# Patient Record
Sex: MALE | Race: OTHER | HISPANIC OR LATINO | Employment: PART TIME | URBAN - METROPOLITAN AREA
[De-identification: names, ages, dates, MRNs, and addresses within clinical notes are randomized per-mention and may not be internally consistent; named-entity substitution may affect disease eponyms.]

---
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
## 2024-06-22 ENCOUNTER — APPOINTMENT (EMERGENCY)
Dept: RADIOLOGY | Facility: HOSPITAL | Age: 24
End: 2024-06-22
Payer: COMMERCIAL

## 2024-06-22 ENCOUNTER — HOSPITAL ENCOUNTER (EMERGENCY)
Facility: HOSPITAL | Age: 24
Discharge: HOME | End: 2024-06-22
Attending: EMERGENCY MEDICINE | Admitting: EMERGENCY MEDICINE
Payer: COMMERCIAL

## 2024-06-22 VITALS
OXYGEN SATURATION: 98 % | TEMPERATURE: 98.6 F | DIASTOLIC BLOOD PRESSURE: 79 MMHG | SYSTOLIC BLOOD PRESSURE: 145 MMHG | HEART RATE: 86 BPM | RESPIRATION RATE: 15 BRPM

## 2024-06-22 DIAGNOSIS — R07.89 CHEST DISCOMFORT: Primary | ICD-10-CM

## 2024-06-22 DIAGNOSIS — F41.9 ANXIETY: ICD-10-CM

## 2024-06-22 LAB
ATRIAL RATE: 82 BPM
P AXIS: 37 DEGREES
PR INTERVAL: 126 MS
QRS AXIS: 51 DEGREES
QRSD INTERVAL: 88 MS
QT INTERVAL: 364 MS
QTC INTERVAL: 425 MS
T WAVE AXIS: 15 DEGREES
VENTRICULAR RATE: 82 BPM

## 2024-06-22 PROCEDURE — 93005 ELECTROCARDIOGRAM TRACING: CPT

## 2024-06-22 PROCEDURE — 99285 EMERGENCY DEPT VISIT HI MDM: CPT | Performed by: EMERGENCY MEDICINE

## 2024-06-22 PROCEDURE — 71045 X-RAY EXAM CHEST 1 VIEW: CPT

## 2024-06-22 PROCEDURE — 99283 EMERGENCY DEPT VISIT LOW MDM: CPT

## 2024-06-22 RX ORDER — LORAZEPAM 0.5 MG/1
0.5 TABLET ORAL ONCE
Status: COMPLETED | OUTPATIENT
Start: 2024-06-22 | End: 2024-06-22

## 2024-06-22 RX ADMIN — LORAZEPAM 0.5 MG: 0.5 TABLET ORAL at 02:45

## 2024-06-22 NOTE — Clinical Note
Don Kapoor was seen and treated in our emergency department on 6/22/2024.                Diagnosis:     Don  may return to work on return date.    He may return on this date: 06/24/2024         If you have any questions or concerns, please don't hesitate to call.      Aayush Castanon MD    ______________________________           _______________          _______________  Hospital Representative                              Date                                Time

## 2024-06-22 NOTE — ED PROVIDER NOTES
History  Chief Complaint   Patient presents with    Anxiety     Pt c/o of anxiety w/ intermittent chest pain that has been ongoing for about a week. He states he has life stressors but did not elaborate. Baby Asprin given per ems.     Patient is a 24-year-old male seen in the emergency department with concern for chest discomfort.  Patient notes intermittent chest discomfort/feeling of heart racing.  Patient notes that symptoms returned this evening prior to evaluation in the emergency department.  Patient states that symptoms feel similar to prior anxiety/panic attacks.  Patient notes no cough, shortness of breath, abdominal pain, nausea, vomiting, weakness.  Patient notes no definite clear exacerbating or alleviating factors for his symptoms.  Patient declines IV and laboratory evaluation in the emergency department.        Prior to Admission Medications   Prescriptions Last Dose Informant Patient Reported? Taking?   dicyclomine (BENTYL) 20 mg tablet   No No   Sig: Take 1 tablet (20 mg total) by mouth 2 (two) times a day      Facility-Administered Medications: None       Past Medical History:   Diagnosis Date    Eczema        Past Surgical History:   Procedure Laterality Date    APPENDECTOMY         No family history on file.  I have reviewed and agree with the history as documented.    E-Cigarette/Vaping    E-Cigarette Use Never User      E-Cigarette/Vaping Substances     Social History     Tobacco Use    Smoking status: Never    Smokeless tobacco: Never   Vaping Use    Vaping status: Never Used   Substance Use Topics    Alcohol use: No    Drug use: No       Review of Systems   Constitutional:  Negative for chills and fever.   HENT:  Negative for ear pain and sore throat.    Eyes:  Negative for pain and visual disturbance.   Respiratory:  Negative for cough and shortness of breath.    Cardiovascular:  Positive for chest pain. Negative for palpitations.   Gastrointestinal:  Negative for abdominal pain and  vomiting.   Genitourinary:  Negative for decreased urine volume and difficulty urinating.   Musculoskeletal:  Negative for gait problem and neck stiffness.   Skin:  Negative for color change and rash.   Neurological:  Negative for seizures and syncope.   Psychiatric/Behavioral:  Negative for agitation and confusion. The patient is nervous/anxious.    All other systems reviewed and are negative.      Physical Exam  Physical Exam  Vitals and nursing note reviewed.   Constitutional:       Appearance: He is well-developed. He is not diaphoretic.   HENT:      Head: Normocephalic and atraumatic.      Right Ear: External ear normal.      Left Ear: External ear normal.      Nose: Nose normal.      Mouth/Throat:      Pharynx: Oropharynx is clear.   Eyes:      General: No scleral icterus.     Conjunctiva/sclera: Conjunctivae normal.   Cardiovascular:      Rate and Rhythm: Normal rate and regular rhythm.      Heart sounds: No murmur heard.  Pulmonary:      Effort: Pulmonary effort is normal. No respiratory distress.      Breath sounds: Normal breath sounds.   Abdominal:      General: There is no distension.      Palpations: Abdomen is soft.      Tenderness: There is no abdominal tenderness.   Musculoskeletal:         General: No deformity or signs of injury.      Cervical back: Normal range of motion and neck supple.   Skin:     General: Skin is warm and dry.   Neurological:      General: No focal deficit present.      Mental Status: He is alert.      Cranial Nerves: No cranial nerve deficit.      Sensory: No sensory deficit.   Psychiatric:         Thought Content: Thought content normal.      Comments: Appears anxious         Vital Signs  ED Triage Vitals [06/22/24 0230]   Temperature Pulse Respirations Blood Pressure SpO2   98.6 °F (37 °C) 86 15 145/79 98 %      Temp Source Heart Rate Source Patient Position - Orthostatic VS BP Location FiO2 (%)   Oral Monitor Lying Left arm --      Pain Score       --           Vitals:     06/22/24 0230   BP: 145/79   Pulse: 86   Patient Position - Orthostatic VS: Lying         Visual Acuity      ED Medications  Medications   LORazepam (ATIVAN) tablet 0.5 mg (0.5 mg Oral Given 6/22/24 0245)       Diagnostic Studies  Results Reviewed       None                   XR chest 1 view portable   ED Interpretation by Aayush Castanon MD (06/22 0302)   No infiltrate or pneumothorax                 Procedures  ECG 12 Lead Documentation Only    Date/Time: 6/22/2024 2:29 AM    Performed by: Aayush Castanon MD  Authorized by: Aayush Castanon MD    Indications / Diagnosis:  Chest pain  ECG reviewed by me, the ED Provider: yes    Rate:     ECG rate:  82    ECG rate assessment: normal    Rhythm:     Rhythm: sinus rhythm    QRS:     QRS axis:  Normal  ST segments:     ST segments:  Normal  T waves:     T waves: normal    Comments:      Normal sinus rhythm at 82, normal axis, , QRS 88, QTc 425, no ST-T wave abnormality, no evidence of acute ischemia           ED Course             HEART Risk Score      Flowsheet Row Most Recent Value   Heart Score Risk Calculator    History 0 Filed at: 06/22/2024 0328   ECG 0 Filed at: 06/22/2024 0328   Age 0 Filed at: 06/22/2024 0328   Risk Factors 0 Filed at: 06/22/2024 0328   Troponin --   HEART Score --                                        Medical Decision Making  Patient is a 24-year-old male seen in the emergency department brought by EMS with concern for chest discomfort, anxiety.  EKG was obtained and noted, and showed no evidence of arrhythmia or ischemia.  Chest x-ray showed no infiltrate or pneumothorax.  Patient was treated with medication for symptom control, with good effect.  Patient declined IV and laboratory evaluation in the emergency department.  There is low suspicion for ACS and PE based on evaluation.  Patient symptoms are suggestive of possible anxiety reaction.  Plan to have patient follow up with PCP/outpatient providers.  Patient stable for discharge  home.  Discharge instructions were reviewed with patient.    Problems Addressed:  Anxiety: acute illness or injury  Chest discomfort: acute illness or injury    Amount and/or Complexity of Data Reviewed  Radiology: ordered and independent interpretation performed. Decision-making details documented in ED Course.  ECG/medicine tests: ordered and independent interpretation performed. Decision-making details documented in ED Course.    Risk  Prescription drug management.             Disposition  Final diagnoses:   Chest discomfort   Anxiety     Time reflects when diagnosis was documented in both MDM as applicable and the Disposition within this note       Time User Action Codes Description Comment    6/22/2024  2:27 AM Aayush Castanon [R07.89] Chest discomfort     6/22/2024  2:27 AM Aayush Castanon [F41.9] Anxiety           ED Disposition       ED Disposition   Discharge    Condition   Stable    Date/Time   Sat Jun 22, 2024 0317    Comment   Don Kapoor discharge to home/self care.                   Follow-up Information       Follow up With Specialties Details Why Contact Info Additional Information    Nakita Calix MD Pediatrics Call in 1 day  [address] [facility] [id number] [phone]       [facility] Cardiology [facility] Cardiology Call  As needed [address] [facility]  [place] [address] [place] [address] [phone] [facility] [address] [place] [address], [place], [place], [address] [phone]            Patient's Medications   Discharge Prescriptions    No medications on file       No discharge procedures on file.    PDMP Review       None            ED Provider  Electronically Signed by             Aayush Castanon MD  06/22/24 0329

## 2024-06-24 ENCOUNTER — VBI (OUTPATIENT)
Dept: PEDIATRICS CLINIC | Facility: CLINIC | Age: 24
End: 2024-06-24

## 2024-06-24 PROCEDURE — 93010 ELECTROCARDIOGRAM REPORT: CPT | Performed by: INTERNAL MEDICINE

## 2024-06-24 NOTE — TELEPHONE ENCOUNTER
06/24/24 9:38 AM    Patient contacted post ED visit, first outreach attempt made. Message was left for patient to return a call to the [facility] Department at [facility]: Phone [phone].    Thank you.  [facility] Florez  [facility]

## 2024-06-25 NOTE — TELEPHONE ENCOUNTER
06/25/24 10:29 AM    Patient contacted post ED visit, second outreach attempt made. Message was left for patient to return a call to the [facility] Department at [facility]: Phone [phone].    Thank you.  [facility] Florez  [facility]

## 2024-06-26 NOTE — TELEPHONE ENCOUNTER
06/26/24 9:51 AM    Patient contacted post ED visit, third outreach attempt made. Message was left for patient to return a call to either the [facility] Department at [facility]: Phone [phone] or the PCP office.     Thank you.  [facility] Florez  [facility]

## 2024-06-26 NOTE — TELEPHONE ENCOUNTER
06/26/24 9:51 AM    Patient contacted post ED visit, phone outreaches were unsuccessful; patient does not have MyChart, a MyChart letter has not been sent.     Thank you.  [facility] Florez  [facility]

## 2024-11-15 ENCOUNTER — HOSPITAL ENCOUNTER (EMERGENCY)
Facility: HOSPITAL | Age: 24
Discharge: HOME | End: 2024-11-15
Attending: EMERGENCY MEDICINE
Payer: COMMERCIAL

## 2024-11-15 ENCOUNTER — TELEPHONE (OUTPATIENT)
Age: 24
End: 2024-11-15

## 2024-11-15 VITALS
RESPIRATION RATE: 16 BRPM | SYSTOLIC BLOOD PRESSURE: 134 MMHG | TEMPERATURE: 98.4 F | DIASTOLIC BLOOD PRESSURE: 81 MMHG | OXYGEN SATURATION: 97 % | HEART RATE: 97 BPM

## 2024-11-15 DIAGNOSIS — E83.42 HYPOMAGNESEMIA: ICD-10-CM

## 2024-11-15 DIAGNOSIS — E87.6 HYPOKALEMIA: ICD-10-CM

## 2024-11-15 DIAGNOSIS — F41.0 PANIC ATTACK: Primary | ICD-10-CM

## 2024-11-15 LAB
ALBUMIN SERPL BCG-MCNC: 4.7 G/DL (ref 3.5–5)
ALP SERPL-CCNC: 90 U/L (ref 34–104)
ALT SERPL W P-5'-P-CCNC: 55 U/L (ref 7–52)
ANION GAP SERPL CALCULATED.3IONS-SCNC: 15 MMOL/L (ref 4–13)
AST SERPL W P-5'-P-CCNC: 23 U/L (ref 13–39)
ATRIAL RATE: 110 BPM
BASOPHILS # BLD AUTO: 0.06 THOUSANDS/ÂΜL (ref 0–0.1)
BASOPHILS NFR BLD AUTO: 1 % (ref 0–1)
BILIRUB SERPL-MCNC: 0.4 MG/DL (ref 0.2–1)
BNP SERPL-MCNC: 14 PG/ML (ref 0–100)
BUN SERPL-MCNC: 12 MG/DL (ref 5–25)
CALCIUM SERPL-MCNC: 9.7 MG/DL (ref 8.4–10.2)
CARDIAC TROPONIN I PNL SERPL HS: <2 NG/L (ref ?–50)
CHLORIDE SERPL-SCNC: 102 MMOL/L (ref 96–108)
CO2 SERPL-SCNC: 18 MMOL/L (ref 21–32)
CREAT SERPL-MCNC: 1.02 MG/DL (ref 0.6–1.3)
EOSINOPHIL # BLD AUTO: 0.11 THOUSAND/ÂΜL (ref 0–0.61)
EOSINOPHIL NFR BLD AUTO: 1 % (ref 0–6)
ERYTHROCYTE [DISTWIDTH] IN BLOOD BY AUTOMATED COUNT: 11.9 % (ref 11.6–15.1)
GFR SERPL CREATININE-BSD FRML MDRD: 101 ML/MIN/1.73SQ M
GLUCOSE SERPL-MCNC: 144 MG/DL (ref 65–140)
GLUCOSE SERPL-MCNC: 152 MG/DL (ref 65–140)
HCT VFR BLD AUTO: 46.2 % (ref 36.5–49.3)
HGB BLD-MCNC: 15.6 G/DL (ref 12–17)
IMM GRANULOCYTES # BLD AUTO: 0.03 THOUSAND/UL (ref 0–0.2)
IMM GRANULOCYTES NFR BLD AUTO: 0 % (ref 0–2)
LYMPHOCYTES # BLD AUTO: 2.09 THOUSANDS/ÂΜL (ref 0.6–4.47)
LYMPHOCYTES NFR BLD AUTO: 22 % (ref 14–44)
MAGNESIUM SERPL-MCNC: 1.5 MG/DL (ref 1.9–2.7)
MCH RBC QN AUTO: 29.7 PG (ref 26.8–34.3)
MCHC RBC AUTO-ENTMCNC: 33.8 G/DL (ref 31.4–37.4)
MCV RBC AUTO: 88 FL (ref 82–98)
MONOCYTES # BLD AUTO: 0.68 THOUSAND/ÂΜL (ref 0.17–1.22)
MONOCYTES NFR BLD AUTO: 7 % (ref 4–12)
NEUTROPHILS # BLD AUTO: 6.51 THOUSANDS/ÂΜL (ref 1.85–7.62)
NEUTS SEG NFR BLD AUTO: 69 % (ref 43–75)
NRBC BLD AUTO-RTO: 0 /100 WBCS
P AXIS: 59 DEGREES
PLATELET # BLD AUTO: 236 THOUSANDS/UL (ref 149–390)
PMV BLD AUTO: 10.5 FL (ref 8.9–12.7)
POTASSIUM SERPL-SCNC: 3.4 MMOL/L (ref 3.5–5.3)
PR INTERVAL: 126 MS
PROT SERPL-MCNC: 7.9 G/DL (ref 6.4–8.4)
QRS AXIS: 44 DEGREES
QRSD INTERVAL: 88 MS
QT INTERVAL: 336 MS
QTC INTERVAL: 454 MS
RBC # BLD AUTO: 5.26 MILLION/UL (ref 3.88–5.62)
SODIUM SERPL-SCNC: 135 MMOL/L (ref 135–147)
T WAVE AXIS: 49 DEGREES
VENTRICULAR RATE: 110 BPM
WBC # BLD AUTO: 9.48 THOUSAND/UL (ref 4.31–10.16)

## 2024-11-15 PROCEDURE — 93010 ELECTROCARDIOGRAM REPORT: CPT | Performed by: INTERNAL MEDICINE

## 2024-11-15 PROCEDURE — 84484 ASSAY OF TROPONIN QUANT: CPT | Performed by: EMERGENCY MEDICINE

## 2024-11-15 PROCEDURE — 96361 HYDRATE IV INFUSION ADD-ON: CPT

## 2024-11-15 PROCEDURE — 82948 REAGENT STRIP/BLOOD GLUCOSE: CPT

## 2024-11-15 PROCEDURE — 93005 ELECTROCARDIOGRAM TRACING: CPT

## 2024-11-15 PROCEDURE — 99285 EMERGENCY DEPT VISIT HI MDM: CPT | Performed by: EMERGENCY MEDICINE

## 2024-11-15 PROCEDURE — 99284 EMERGENCY DEPT VISIT MOD MDM: CPT

## 2024-11-15 PROCEDURE — 96374 THER/PROPH/DIAG INJ IV PUSH: CPT

## 2024-11-15 PROCEDURE — 85025 COMPLETE CBC W/AUTO DIFF WBC: CPT | Performed by: EMERGENCY MEDICINE

## 2024-11-15 PROCEDURE — 83735 ASSAY OF MAGNESIUM: CPT | Performed by: EMERGENCY MEDICINE

## 2024-11-15 PROCEDURE — 36415 COLL VENOUS BLD VENIPUNCTURE: CPT | Performed by: EMERGENCY MEDICINE

## 2024-11-15 PROCEDURE — 83880 ASSAY OF NATRIURETIC PEPTIDE: CPT | Performed by: EMERGENCY MEDICINE

## 2024-11-15 PROCEDURE — 80053 COMPREHEN METABOLIC PANEL: CPT | Performed by: EMERGENCY MEDICINE

## 2024-11-15 RX ORDER — LORAZEPAM 2 MG/ML
1 INJECTION INTRAMUSCULAR ONCE
Status: COMPLETED | OUTPATIENT
Start: 2024-11-15 | End: 2024-11-15

## 2024-11-15 RX ORDER — HYDROXYZINE HYDROCHLORIDE 50 MG/1
50 TABLET, FILM COATED ORAL EVERY 8 HOURS PRN
Qty: 5 TABLET | Refills: 0 | Status: SHIPPED | OUTPATIENT
Start: 2024-11-15 | End: 2024-11-25

## 2024-11-15 RX ORDER — LANOLIN ALCOHOL/MO/W.PET/CERES
800 CREAM (GRAM) TOPICAL ONCE
Status: COMPLETED | OUTPATIENT
Start: 2024-11-15 | End: 2024-11-15

## 2024-11-15 RX ORDER — POTASSIUM CHLORIDE 1500 MG/1
20 TABLET, EXTENDED RELEASE ORAL ONCE
Status: COMPLETED | OUTPATIENT
Start: 2024-11-15 | End: 2024-11-15

## 2024-11-15 RX ADMIN — SODIUM CHLORIDE 1000 ML: 0.9 INJECTION, SOLUTION INTRAVENOUS at 03:41

## 2024-11-15 RX ADMIN — LORAZEPAM 1 MG: 2 INJECTION INTRAMUSCULAR; INTRAVENOUS at 03:40

## 2024-11-15 RX ADMIN — Medication 800 MG: at 04:42

## 2024-11-15 RX ADMIN — POTASSIUM CHLORIDE 20 MEQ: 1500 TABLET, EXTENDED RELEASE ORAL at 04:42

## 2024-11-15 NOTE — TELEPHONE ENCOUNTER
Contacted patient in regards to Routine Referral in attempts to verify patient's needs of services and add patient to proper wait list. LVM for patient to contact intake dept  in regards to routine referral at [phone] opt 3. 1st attempt.

## 2024-11-15 NOTE — ED PROVIDER NOTES
Time reflects when diagnosis was documented in both MDM as applicable and the Disposition within this note       Time User Action Codes Description Comment    11/15/2024  3:37 AM Aayush Castanon Add [F41.0] Panic attack     11/15/2024  4:03 AM Aayush Castanon Add [E87.6] Hypokalemia     11/15/2024  4:03 AM Aayush Castanon Leo [E83.42] Hypomagnesemia           ED Disposition       ED Disposition   Discharge    Condition   Stable    Date/Time   Fri Nov 15, 2024  4:39 AM    Comment   Don Kapoor discharge to home/self care.                   Assessment & Plan       Medical Decision Making  Patient is a 24-year-old male seen in the emergency department with concern for apparent panic attack/anxiety reaction.  EKG was obtained and noted, which showed no definite evidence of arrhythmia or ischemia.  Patient was treated with medication for symptom control, with good effect.  Laboratory evaluation remarkable for low potassium of 3.4, low CO2 of 18, mildly elevated anion gap of 15, elevated glucose of 152, elevated ALT of 55, low magnesium of 1.5. Evaluation is not consistent with ACS, PE, arrhythmia, or acute anemia.  Evaluation is consistent with apparent panic attack. Plan to have patient follow up with PCP/outpatient providers.  Patient stable for discharge home.  Discharge instructions were reviewed with patient.    Problems Addressed:  Panic attack: acute illness or injury    Amount and/or Complexity of Data Reviewed  Labs: ordered. Decision-making details documented in ED Course.  ECG/medicine tests: ordered and independent interpretation performed. Decision-making details documented in ED Course.    Risk  OTC drugs.  Prescription drug management.             Medications   potassium chloride (Klor-Con M20) CR tablet 20 mEq (has no administration in time range)   magnesium Oxide (MAG-OX) tablet 800 mg (has no administration in time range)   LORazepam (ATIVAN) injection 1 mg (1 mg Intravenous Given 11/15/24 0340)    sodium chloride 0.9 % bolus 1,000 mL (1,000 mL Intravenous New Bag 11/15/24 0341)       ED Risk Strat Scores                                               History of Present Illness       Chief Complaint   Patient presents with    Hypertension     Pt arrived banging on ED doors demanding to speak to a doctor,c/o of hypertension, hysterical upon triage. Pt states his systolic number was 165.  Pt took 1 unknown blood pressure pill from his friend. On set of symptoms at midnight.       Past Medical History:   Diagnosis Date    Eczema       Past Surgical History:   Procedure Laterality Date    APPENDECTOMY        History reviewed. No pertinent family history.   Social History     Tobacco Use    Smoking status: Never    Smokeless tobacco: Never   Vaping Use    Vaping status: Never Used   Substance Use Topics    Alcohol use: No    Drug use: No      E-Cigarette/Vaping    E-Cigarette Use Never User       E-Cigarette/Vaping Substances      I have reviewed and agree with the history as documented.     Patient is a 24-year-old male seen in the emergency department with concern for apparent panic attack/anxiety.  Patient states that he began to feel anxious at work this evening, and states that his blood pressure was as high as 160s systolic.  Patient states that he took a dose of unknown blood pressure medication from a friend, but stated that he still felt unwell.  Patient states that he noted tingling in his extremities. Patient notes no chest pain, shortness of breath, abdominal pain, nausea, vomiting, weakness.  Patient notes no other definite clear exacerbating or alleviating factors for his symptoms.        Review of Systems   Constitutional:  Negative for chills and fever.   HENT:  Negative for ear pain and sore throat.    Eyes:  Negative for pain and visual disturbance.   Respiratory:  Negative for cough and shortness of breath.    Cardiovascular:  Negative for chest pain and leg swelling.   Gastrointestinal:   Negative for abdominal pain and vomiting.   Genitourinary:  Negative for decreased urine volume and difficulty urinating.   Musculoskeletal:  Negative for arthralgias and back pain.   Skin:  Negative for color change and rash.   Neurological:  Positive for numbness. Negative for seizures and syncope.   Psychiatric/Behavioral:  Negative for confusion. The patient is nervous/anxious.    All other systems reviewed and are negative.          Objective       ED Triage Vitals   Temperature Pulse Blood Pressure Respirations SpO2 Patient Position - Orthostatic VS   11/15/24 0334 11/15/24 0332 11/15/24 0332 11/15/24 0332 11/15/24 0332 11/15/24 0332   98.4 °F (36.9 °C) (!) 107 167/86 22 98 % Lying      Temp Source Heart Rate Source BP Location FiO2 (%) Pain Score    11/15/24 0334 11/15/24 0332 11/15/24 0332 -- --    Oral Monitor Right arm        Vitals      Date and Time Temp Pulse SpO2 Resp BP Pain Score FACES Pain Rating User   11/15/24 0430 -- 97 97 % 16 134/81 -- -- DS   11/15/24 0345 -- 86 97 % 16 154/75 -- -- DS   11/15/24 0334 98.4 °F (36.9 °C) 93 99 % 20 167/86 -- -- [place]   11/15/24 0332 -- 107 98 % 22 167/86 -- -- AF            Physical Exam  Vitals and nursing note reviewed.   Constitutional:       General: He is not in acute distress.     Appearance: He is well-developed.   HENT:      Head: Normocephalic and atraumatic.      Right Ear: External ear normal.      Left Ear: External ear normal.      Nose: Nose normal.      Mouth/Throat:      Pharynx: Oropharynx is clear.   Eyes:      General: No scleral icterus.     Conjunctiva/sclera: Conjunctivae normal.   Cardiovascular:      Rate and Rhythm: Normal rate and regular rhythm.      Heart sounds: No murmur heard.  Pulmonary:      Effort: Pulmonary effort is normal. No respiratory distress.      Breath sounds: Normal breath sounds.   Abdominal:      General: Abdomen is flat. There is no distension.   Musculoskeletal:         General: No deformity or signs of injury.       Cervical back: Normal range of motion and neck supple.   Skin:     General: Skin is warm and dry.   Neurological:      General: No focal deficit present.      Mental Status: He is alert.      Cranial Nerves: No cranial nerve deficit.      Sensory: No sensory deficit.   Psychiatric:         Thought Content: Thought content normal.      Comments: Appears anxious         Results Reviewed       Procedure Component Value Units Date/Time    HS Troponin 0hr (reflex protocol) [[id number]]  (Normal) Collected: 11/15/24 0335    Lab Status: Final result Specimen: Blood from Arm, Left Updated: 11/15/24 0406     hs TnI 0hr <2 ng/L     B-Type Natriuretic Peptide(BNP) [[id number]]  (Normal) Collected: 11/15/24 0335    Lab Status: Final result Specimen: Blood from Arm, Left Updated: 11/15/24 0404     BNP 14 pg/mL     Comprehensive metabolic panel [[id number]]  (Abnormal) Collected: 11/15/24 0335    Lab Status: Final result Specimen: Blood from Arm, Left Updated: 11/15/24 0402     Sodium 135 mmol/L      Potassium 3.4 mmol/L      Chloride 102 mmol/L      CO2 18 mmol/L      ANION GAP 15 mmol/L      BUN 12 mg/dL      Creatinine 1.02 mg/dL      Glucose 152 mg/dL      Calcium 9.7 mg/dL      AST 23 U/L      ALT 55 U/L      Alkaline Phosphatase 90 U/L      Total Protein 7.9 g/dL      Albumin 4.7 g/dL      Total Bilirubin 0.40 mg/dL      eGFR 101 ml/min/1.73sq m     Narrative:      National Kidney Disease Foundation guidelines for Chronic Kidney Disease (CKD):     Stage 1 with normal or high GFR (GFR > 90 mL/min/1.73 square meters)    Stage 2 Mild CKD (GFR = 60-89 mL/min/1.73 square meters)    Stage 3A Moderate CKD (GFR = 45-59 mL/min/1.73 square meters)    Stage 3B Moderate CKD (GFR = 30-44 mL/min/1.73 square meters)    Stage 4 Severe CKD (GFR = 15-29 mL/min/1.73 square meters)    Stage 5 End Stage CKD (GFR <15 mL/min/1.73 square meters)  Note: GFR calculation is accurate only with a steady state creatinine    Magnesium [[id number]]   (Abnormal) Collected: 11/15/24 0335    Lab Status: Final result Specimen: Blood from Arm, Left Updated: 11/15/24 0402     Magnesium 1.5 mg/dL     CBC and differential [[id number]] Collected: 11/15/24 0335    Lab Status: Final result Specimen: Blood from Arm, Left Updated: 11/15/24 0348     WBC 9.48 Thousand/uL      RBC 5.26 Million/uL      Hemoglobin 15.6 g/dL      Hematocrit 46.2 %      MCV 88 fL      MCH 29.7 pg      MCHC 33.8 g/dL      RDW 11.9 %      MPV 10.5 fL      Platelets 236 Thousands/uL      nRBC 0 /100 WBCs      Segmented % 69 %      Immature Grans % 0 %      Lymphocytes % 22 %      Monocytes % 7 %      Eosinophils Relative 1 %      Basophils Relative 1 %      Absolute Neutrophils 6.51 Thousands/µL      Absolute Immature Grans 0.03 Thousand/uL      Absolute Lymphocytes 2.09 Thousands/µL      Absolute Monocytes 0.68 Thousand/µL      Eosinophils Absolute 0.11 Thousand/µL      Basophils Absolute 0.06 Thousands/µL     Fingerstick Glucose (POCT) [[id number]]  (Abnormal) Collected: 11/15/24 0328    Lab Status: Final result Specimen: Blood Updated: 11/15/24 0329     POC Glucose 144 mg/dl             No orders to display       ECG 12 Lead Documentation Only    Date/Time: 11/15/2024 3:49 AM    Performed by: Aayush Castanon MD  Authorized by: Aayush Castanon MD    Indications / Diagnosis:  HTN  ECG reviewed by me, the ED Provider: yes    Patient location:  ED  Rate:     ECG rate:  110    ECG rate assessment: tachycardic    QRS:     QRS axis:  Normal  ST segments:     ST segments:  Non-specific  T waves:     T waves: non-specific    Comments:      Sinus tachycardia at 110, normal axis, , QRS 88, QTc 454, nonspecific ST-T wave abnormality, no definite evidence of acute ischemia      ED Medication and Procedure Management   Prior to Admission Medications   Prescriptions Last Dose Informant Patient Reported? Taking?   dicyclomine (BENTYL) 20 mg tablet   No No   Sig: Take 1 tablet (20 mg total) by mouth 2 (two)  times a day      Facility-Administered Medications: None     Patient's Medications   Discharge Prescriptions    HYDROXYZINE HCL (ATARAX) 50 MG TABLET    Take 1 tablet (50 mg total) by mouth every 8 (eight) hours as needed (anxiety) for up to 10 days       Start Date: 11/15/2024End Date: 11/25/2024       Order Dose: 50 mg       Quantity: 5 tablet    Refills: 0     No discharge procedures on file.  ED SEPSIS DOCUMENTATION   Time reflects when diagnosis was documented in both MDM as applicable and the Disposition within this note       Time User Action Codes Description Comment    11/15/2024  3:37 AM Aayush Castanon [F41.0] Panic attack     11/15/2024  4:03 AM Aayush Castanon [E87.6] Hypokalemia     11/15/2024  4:03 AM Aayush Castanon [E83.42] Hypomagnesemia                  Aayush Castanon MD  11/15/24 0441

## 2024-11-15 NOTE — Clinical Note
Don Kapoor was seen and treated in our emergency department on 11/15/2024.                Diagnosis:     Don  may return to work on return date.    He may return on this date: 11/16/2024         If you have any questions or concerns, please don't hesitate to call.      Aayush aCstanon MD    ______________________________           _______________          _______________  Hospital Representative                              Date                                Time

## 2024-11-16 ENCOUNTER — OFFICE VISIT (OUTPATIENT)
Dept: OBGYN CLINIC | Facility: CLINIC | Age: 24
End: 2024-11-16
Payer: COMMERCIAL

## 2024-11-16 VITALS — HEIGHT: 71 IN | HEART RATE: 90 BPM | BODY MASS INDEX: 34.44 KG/M2 | WEIGHT: 246 LBS | OXYGEN SATURATION: 99 %

## 2024-11-16 DIAGNOSIS — M94.0 COSTOCHONDRITIS: Primary | ICD-10-CM

## 2024-11-16 PROBLEM — S43.432A TEAR OF LEFT GLENOID LABRUM: Status: ACTIVE | Noted: 2017-10-03

## 2024-11-16 PROBLEM — L30.9 ECZEMA: Status: ACTIVE | Noted: 2017-04-11

## 2024-11-16 PROBLEM — M75.42 IMPINGEMENT SYNDROME OF LEFT SHOULDER: Status: ACTIVE | Noted: 2017-10-03

## 2024-11-16 PROCEDURE — 99203 OFFICE O/P NEW LOW 30 MIN: CPT | Performed by: PHYSICIAN ASSISTANT

## 2024-11-16 RX ORDER — MELOXICAM 15 MG/1
15 TABLET ORAL DAILY
Qty: 30 TABLET | Refills: 0 | Status: SHIPPED | OUTPATIENT
Start: 2024-11-16

## 2024-11-16 NOTE — PROGRESS NOTES
Assessment & Plan   Diagnoses and all orders for this visit:    Costochondritis    - Mobic as needed for rib soreness  - Go directly to the ER with any shortness of breath or increasing chest pain  - Follow up with Dr. Ladd or Dr. Oquendo in 4 weeks      Subjective   Patient ID: Don Kapoor is a 24 y.o. male.    Vitals:    11/16/24 0753   Pulse: 90   SpO2: 99%     25yo male comes in for soreness in his ribs, b/l.  This has been on and off for 6 months.  It tends to be aggravated by lifting weights, deep breaths, and by having panic attacks.  No sob.  Asymptomatic now. He was in the ED yesterday for a panic attack. He was also evaluated yesterday in a [facility].  He reports he had xrays and an EKG. Right now, he has no pain at rest.  No back pain.  No numbness or tingling. He takes Advil as needed- has occasional dyspepsia with this.        The following portions of the patient's history were reviewed and updated as appropriate: allergies, current medications, past family history, past medical history, past social history, past surgical history, and problem list.    Review of Systems  Ortho Exam  Past Medical History:   Diagnosis Date    Eczema      Past Surgical History:   Procedure Laterality Date    APPENDECTOMY       History reviewed. No pertinent family history.  Social History     Occupational History    Not on file   Tobacco Use    Smoking status: Never    Smokeless tobacco: Never   Vaping Use    Vaping status: Never Used   Substance and Sexual Activity    Alcohol use: No    Drug use: No    Sexual activity: Not on file       Review of Systems   Constitutional: Negative.    HENT: Negative.    Eyes: Negative.    Respiratory: Negative.    Cardiovascular: Negative.    Gastrointestinal: Negative.    Endocrine: Negative.    Genitourinary: Negative.    Musculoskeletal: As below..   Allergic/Immunologic: Negative.    Neurological: Negative.    Hematological: Negative.    Psychiatric/Behavioral: Negative.         Objective   Physical Exam    Xrays:      Constitutional: Awake, Alert, Oriented  Eyes: EOMI  Psych: Mood and affect appropriate  Heart: regular rate   Lungs: No audible wheezing  Abdomen: No guarding  Lymph: no lymphedema      bilateral Ribs  Appearance  No swelling, discoloration, deformity, or ecchymosis  Palpation  + Reproducible tenderness over the constochondral cartilage  ROM  Full and pain-free active ROM of the shoulders  Motor  Internal and external rotation 5/5 with shoulder at side, flexion 5/5  Special tests  Empty Can Test negative, Drop Arm Test negative, and Hawkin's Impingement Test negative  NVI distally

## 2024-11-16 NOTE — PATIENT INSTRUCTIONS
"Patient Education     Costochondritis   The Basics   Written by the doctors and editors at [facility]   What is costochondritis? -- This is a condition that causes pain and tenderness in the chest. The pain happens in an area called the \"costosternal\" joints, where the ribs meet the breastbone (figure 1). The pain from costochondritis affects only a small area and does not always get worse when you move around.  What causes costochondritis? -- Most of the time, doctors don't know why people get costochondritis. But in some people, it might be caused by:   A blow to the chest   Heavy lifting or hard exercise   An illness that causes you to cough and sneeze  What are the symptoms of costochondritis? -- The symptoms include:   Pain and tenderness in the chest - The pain can be sharp, or it might be dull and gnawing.   Pain when you take a deep breath   Pain when you cough  Is there a test for costochondritis? -- No. But your doctor or nurse should be able to tell if you have it by learning about your symptoms and doing an exam. Sometimes, they might do other tests to make sure that you do not have a different problem.  What can I do on my own to feel better? -- Costochondritis usually goes away without any treatment. But there are things that can help you feel better sooner. Some people feel better if they:   Do stretching exercises - Ask your doctor or nurse to show you what to do.   Put a heating pad on the painful area a few times a day.   Rest - Avoid doing activities that strain the area for 1 to 2 weeks, including lifting or pushing heavy objects.  Some over-the-counter medicines can also help ease pain, including:   Pain-relieving creams or gels that contain medicines called salicylates, for example, 1% diclofenac gel (brand name: Voltaren)   Patches, creams, or gels that contain a numbing medicine called lidocaine or a pain-relieving substance called capsaicin   Pain-relieving pills such as acetaminophen (sample " brand name: Tylenol) or ibuprofen (sample brand names: Advil, Motrin)  When should I call the doctor? -- Call for advice if your pain does not get better after a week or 2.  All topics are updated as new evidence becomes available and our peer review process is complete.  This topic retrieved from [employer] on: Apr 12, 2024.  Topic 54668 Version 15.0  Release: 32.3.2 - C32.101  © 2024 UpToDate, Inc. and/or its affiliates. All rights reserved.  figure 1: Costochrondritis     Costochondritis causes pain and tenderness in the costosternal joints.  Graphic 05772 Version 2.0  Consumer Information Use and Disclaimer   Disclaimer: This generalized information is a limited summary of diagnosis, treatment, and/or medication information. It is not meant to be comprehensive and should be used as a tool to help the user understand and/or assess potential diagnostic and treatment options. It does NOT include all information about conditions, treatments, medications, side effects, or risks that may apply to a specific patient. It is not intended to be medical advice or a substitute for the medical advice, diagnosis, or treatment of a health care provider based on the health care provider's examination and assessment of a patient's specific and unique circumstances. Patients must speak with a health care provider for complete information about their health, medical questions, and treatment options, including any risks or benefits regarding use of medications. This information does not endorse any treatments or medications as safe, effective, or approved for treating a specific patient. UpToDate, Inc. and its affiliates disclaim any warranty or liability relating to this information or the use thereof.The use of this information is governed by the Terms of Use, available at https://www.woltersAzendoouwer.com/en/know/clinical-effectiveness-terms. 2024© UpToDate, Inc. and its affiliates and/or licensors. All rights reserved.  Copyright   ©  2024 UpToDate, Inc. and/or its affiliates. All rights reserved.

## 2024-11-18 ENCOUNTER — TELEPHONE (OUTPATIENT)
Age: 24
End: 2024-11-18

## 2024-11-18 NOTE — TELEPHONE ENCOUNTER
Contacted patient in regards to Routine Referral in attempts to verify patient's needs of services and add patient to proper wait list. LVM for patient to contact intake dept  in regards to routine referral at [phone]. 2nd attempt.

## 2024-11-19 ENCOUNTER — TELEPHONE (OUTPATIENT)
Age: 24
End: 2024-11-19

## 2024-11-19 DIAGNOSIS — M94.0 COSTOCHONDRITIS: Primary | ICD-10-CM

## 2024-11-19 RX ORDER — GABAPENTIN 100 MG/1
100 CAPSULE ORAL 3 TIMES DAILY
Qty: 60 CAPSULE | Refills: 0 | Status: SHIPPED | OUTPATIENT
Start: 2024-11-19

## 2024-11-19 NOTE — TELEPHONE ENCOUNTER
Unfortunately nothing stronger can be prescribed. I would continue the meloxicam and take tylenol 100mg TID along with it. He can also try OTC voltaren gel in the affected area as well.

## 2024-11-19 NOTE — TELEPHONE ENCOUNTER
Called and spoke w/pt and relayed EMMANUELLE Ghotra's msg. No further questions/concerns. Will CB if needed.

## 2024-11-19 NOTE — TELEPHONE ENCOUNTER
Caller: Patient    Doctor: León [place]    Reason for call: Patient calling stating that the mobic doesn't seem to be helping with the pain and patient is wondering if something else can be prescribed?  Patient asking to speak to clinical team.     Call back#: [phone]

## 2024-11-19 NOTE — TELEPHONE ENCOUNTER
Called and spoke w/pt and relayed EMMANUELLE Ghotra's msg. -advised 1000mg Tylenol three times a day with meloxicam. Pt state he was in ED 11/15/24 and was given hydroxyzine 50mg every 8 hour as needed for anxiety that is helping with his pain but he is wanting something to help with the nerve pain he is experiencing in his B/L ribs and under his pecs.  Please review and advise. Thank you.

## 2024-11-25 NOTE — TELEPHONE ENCOUNTER
Contacted patient in regards to Routine Referral in attempts to verify patient's needs of services and add patient to proper wait list. spoke with patient whom stated is not interested in mental health services at this time. Writer shared referral is good for a year, if pt seeks services in the future to contact us.     Closing referral.

## 2024-12-11 ENCOUNTER — TELEPHONE (OUTPATIENT)
Dept: OBGYN CLINIC | Facility: CLINIC | Age: 24
End: 2024-12-11

## 2024-12-11 NOTE — TELEPHONE ENCOUNTER
----- Message from Kat MOFFETT sent at 12/11/2024 12:08 PM EST -----  This patient was referred by León to non-op sports but scheduled with Dr. WILKINSON for 12/16. Can someone please call patient and reschedule with non-op sports per Dr. WILKINSON and León's note? Thankyou!

## 2024-12-11 NOTE — TELEPHONE ENCOUNTER
LMOM to notify patient per task message, follow up appointment was scheduled with Dr. Hammonds instead of Dr. Oquendo or Dr. Ladd.  Advised patient to call back to office to reschedule appointment with either one of them.

## 2024-12-13 DIAGNOSIS — M94.0 COSTOCHONDRITIS: ICD-10-CM

## 2024-12-13 NOTE — TELEPHONE ENCOUNTER
[id number] - [place] - [address] -[phone]

## 2024-12-14 ENCOUNTER — NURSE TRIAGE (OUTPATIENT)
Dept: OTHER | Facility: OTHER | Age: 24
End: 2024-12-14

## 2024-12-14 RX ORDER — GABAPENTIN 100 MG/1
100 CAPSULE ORAL 3 TIMES DAILY
Qty: 60 CAPSULE | Refills: 0 | Status: SHIPPED | OUTPATIENT
Start: 2024-12-14

## 2024-12-14 NOTE — TELEPHONE ENCOUNTER
"Reason for Disposition   [1] MILD dizziness (e.g., walking normally) AND [2] has been evaluated by doctor (or NP/PA) for this    Answer Assessment - Initial Assessment Questions  1. DESCRIPTION: \"Describe your dizziness.\"        \"Brain feels dizzy\"    2. LIGHTHEADED: \"Do you feel lightheaded?\" (e.g., somewhat faint, woozy, weak upon standing)        Yes    3. VERTIGO: \"Do you feel like either you or the room is spinning or tilting?\" (i.e., vertigo)        Feels like he is spinning    4. SEVERITY: \"How bad is it?\"  \"Do you feel like you are going to faint?\" \"Can you stand and walk?\"        Able to stand and walk    5. ONSET:  \"When did the dizziness begin?\"        2 day    6. AGGRAVATING FACTORS: \"Does anything make it worse?\" (e.g., standing, change in head position)        Heating pad makes chest pain feel better  Dizziness is worse when having the chest pain    7. HEART RATE: \"Can you tell me your heart rate?\" \"How many beats in 15 seconds?\"  (Note: Not all patients can do this.)        Asymptomatic    8. CAUSE: \"What do you think is causing the dizziness?\" (e.g., decreased fluids or food, diarrhea, emotional distress, heat exposure, new medicine, sudden standing, vomiting; unknown)        Attributes to costochondritis    9. RECURRENT SYMPTOM: \"Have you had dizziness before?\" If Yes, ask: \"When was the last time?\" \"What happened that time?\"        Yes intermittently since costochondritis dx    10. OTHER SYMPTOMS: \"Do you have any other symptoms?\" (e.g., fever, chest pain, vomiting, diarrhea, bleeding)          Chest pain 5-6/10  Denies SOB, fevers  Panic attacks at times    Protocols used: Dizziness - Lightheadedness-Adult-AH      Patient reports intermittent ongoing symptoms he attributes to costochondritis diagnosis.  Advised to seek ED care for urgent symptoms but that message would be fwd to office for follow up.    Please follow up with patient.  "

## 2024-12-14 NOTE — TELEPHONE ENCOUNTER
Regarding: stormy  ----- Message from Flor MARY sent at 12/14/2024  8:30 AM EST -----  Patient is calling in stating that he was seen in the office on 11/16 with SHARRI Malik for chest pain. He is stating that he is feeling very dizzy as it is hard to function and get things done. Patient stated that his nerves have been really on edge and he has been having mini- panic attacks as well. He is asking as to what further he can do relating this, please advise and reach out to patient.

## 2024-12-16 ENCOUNTER — NURSE TRIAGE (OUTPATIENT)
Age: 24
End: 2024-12-16

## 2024-12-16 NOTE — TELEPHONE ENCOUNTER
Regarding: rib pain, anxiety, dizziness  ----- Message from Rosie TRIPATHI sent at 12/16/2024  8:56 AM EST -----  Patient looking for answers regarding his ongoing rib pain. He said there's two different types of pain:  Stinging pain and like he's been hit by baseball bat. The pain level fluctuates between a 4 and 9. He also has dizziness and anxiety associated with the pain. The only thing that seems to help is gabapentin. These symptoms were also documented in triage encounter 12/14/24 and a prescription for gabapentin was sent to [facility] the same day. He was informed that per conversation in the chart, evaluation at ED may be appropriate.    Call back #[phone]

## 2024-12-16 NOTE — TELEPHONE ENCOUNTER
"Reason for Disposition   Taking a medicine that could cause dizziness (e.g., blood pressure medications, diuretics)    Answer Assessment - Initial Assessment Questions  1. DESCRIPTION: \"Describe your dizziness.\"      Constant, lingers for 3 days and resolved w/Gabapentin 400mg  2. LIGHTHEADED: \"Do you feel lightheaded?\" (e.g., somewhat faint, woozy, weak upon standing)      Happened when standing and walking, and more he worried the more dizzy he got.   3. VERTIGO: \"Do you feel like either you or the room is spinning or tilting?\" (i.e., vertigo)      Brain was spinning  4. SEVERITY: \"How bad is it?\"  \"Do you feel like you are going to faint?\" \"Can you stand and walk?\"      Felt like he was going to faint  5. ONSET:  \"When did the dizziness begin?\"      3 days ago  6. AGGRAVATING FACTORS: \"Does anything make it worse?\" (e.g., standing, change in head position)      When he stopped taking the gabapentin and when he resumed at 400mg it got better.   7. HEART RATE: \"Can you tell me your heart rate?\" \"How many beats in 15 seconds?\"  (Note: Not all patients can do this.)        Rare heart palpitations and he stopped taking stimulants like caffiene  8. CAUSE: \"What do you think is causing the dizziness?\" (e.g., decreased fluids or food, diarrhea, emotional distress, heat exposure, new medicine, sudden standing, vomiting; unknown)      Stopped taking Meloxicam and he feels better and is eating and drinking better as it was upseting stomach.   9. RECURRENT SYMPTOM: \"Have you had dizziness before?\" If Yes, ask: \"When was the last time?\" \"What happened that time?\"      Just a little bit when he had anxiety.   10. OTHER SYMPTOMS: \"Do you have any other symptoms?\" (e.g., fever, chest pain, vomiting, diarrhea, bleeding)        No. Just the bilateral rib pain that is electrical in nature; worse on right than left. Feels like a punch. Was a 9/10 before taking 400mg of gabapentin and is now 3-3/5/10 after taking gabapen at 11pm last " "night and another 100mg at 5AM this morning!    Protocols used: Dizziness - Lightheadedness-Adult-AH  Reviewed OV notes, [facility] notes. Offered to make earlier NP appt w/Dr Hammonds today at 1130 open slot to discuss symptoms and Gabapentin dosing he has been taking that is not prescribed that he states has resolved his symptoms.  Pt declined as he does not want to pay a copay and be told to \"go home and rest\" like at his last appt. Advised that DEEPALI Malik PA-C has referred him to see Dr Hammonds for his rib pain and that the gabapentin was prescribed for the nerve pain and will need to be adjusted if it is helping w/his symptoms. Pt states he just wants to let DEEPALI Malik know what is going on.  Informed pt that I would let her know. In the interim, if he gets worse to go to the ED. Pt states he understands. Please review and advise. Thank you.   "

## 2024-12-16 NOTE — TELEPHONE ENCOUNTER
Patient called to see if a refill of gabapentin had been sent to the pharmacy. Patient made aware medication was refilled on 12/14/24 for 60 capsules at [facility]. Patient instructed to contact the pharmacy to obtain refill of medication. Patient verbalized understanding.

## 2024-12-20 ENCOUNTER — TELEPHONE (OUTPATIENT)
Age: 24
End: 2024-12-20

## 2024-12-20 DIAGNOSIS — M94.0 COSTOCHONDRITIS: Primary | ICD-10-CM

## 2024-12-20 RX ORDER — GABAPENTIN 300 MG/1
300 CAPSULE ORAL 3 TIMES DAILY
Qty: 90 CAPSULE | Refills: 0 | Status: SHIPPED | OUTPATIENT
Start: 2024-12-20

## 2025-01-09 ENCOUNTER — TELEPHONE (OUTPATIENT)
Dept: OBGYN CLINIC | Facility: CLINIC | Age: 25
End: 2025-01-09

## 2025-01-09 NOTE — TELEPHONE ENCOUNTER
LMOM for Pt to meet with their PCP regarding rib injury. And or meet with Akiko Du or Wilberto for follow up.    Dr. WILKINSON does not treat that injury.

## 2025-01-14 ENCOUNTER — TELEPHONE (OUTPATIENT)
Dept: OBGYN CLINIC | Facility: CLINIC | Age: 25
End: 2025-01-14

## 2025-01-14 NOTE — TELEPHONE ENCOUNTER
Tried to contact patient to say they should meet with their PCP or Dr. Du/Wilberto for their rib injury.

## 2025-03-13 DIAGNOSIS — M94.0 COSTOCHONDRITIS: ICD-10-CM

## 2025-03-14 RX ORDER — GABAPENTIN 300 MG/1
300 CAPSULE ORAL 3 TIMES DAILY
Qty: 90 CAPSULE | Refills: 0 | OUTPATIENT
Start: 2025-03-14

## 2025-03-17 DIAGNOSIS — M94.0 COSTOCHONDRITIS: ICD-10-CM

## 2025-03-17 NOTE — TELEPHONE ENCOUNTER
Reason for call:   [x] Refill   [] Prior Auth  [] Other:     Office:   [] PCP/Provider -   [x] Specialty/Provider - ortho/Gomez Ghotra PA-C     Medication: gabapentin (Neurontin) 300 mg capsule     Dose/Frequency:        Take 1 capsule (300 mg total) by mouth 3 (three) times a day                    Quantity: 90    Pharmacy: [id number] - [place], [place] - [address] [phone]     Local Pharmacy   Does the patient have enough for 3 days?   [] Yes   [x] No - Send as HP to POD    Mail Away Pharmacy   Does the patient have enough for 10 days?   [] Yes   [] No - Send as HP to POD

## 2025-03-19 RX ORDER — GABAPENTIN 300 MG/1
300 CAPSULE ORAL 3 TIMES DAILY
Qty: 90 CAPSULE | Refills: 0 | OUTPATIENT
Start: 2025-03-19

## 2025-03-19 NOTE — TELEPHONE ENCOUNTER
Patient is requesting a call back as to why his refill request was denied twice with no explanation. Patient can be reached at [phone]

## 2025-03-20 NOTE — TELEPHONE ENCOUNTER
CLM on VM to pt to return call to relay EMMANUELLE Ghotra's msg and to schedule w/primary care sports medicine if still having pain. Please schedule. Thank you.

## 2025-03-24 NOTE — TELEPHONE ENCOUNTER
CLM on VM for pt to return call if still having pain to schedule w/primary care sports medicine provider. Please schedule if pt returns call. Thank you.

## 2025-03-26 NOTE — TELEPHONE ENCOUNTER
3rd CLM on VM for pt that med requested was not refilled and if still having pain to CB and make appt w/primary care sports medicine at .